# Patient Record
Sex: MALE | Race: WHITE | NOT HISPANIC OR LATINO | Employment: PART TIME | URBAN - METROPOLITAN AREA
[De-identification: names, ages, dates, MRNs, and addresses within clinical notes are randomized per-mention and may not be internally consistent; named-entity substitution may affect disease eponyms.]

---

## 2017-03-10 ENCOUNTER — HOSPITAL ENCOUNTER (OUTPATIENT)
Dept: RADIOLOGY | Facility: MEDICAL CENTER | Age: 27
End: 2017-03-10
Attending: INTERNAL MEDICINE | Admitting: SURGERY
Payer: COMMERCIAL

## 2017-03-10 DIAGNOSIS — Z01.810 PRE-OPERATIVE CARDIOVASCULAR EXAMINATION: ICD-10-CM

## 2017-03-10 DIAGNOSIS — Z01.811 PRE-OPERATIVE RESPIRATORY EXAMINATION: ICD-10-CM

## 2017-03-10 DIAGNOSIS — Z01.812 PRE-OPERATIVE LABORATORY EXAMINATION: ICD-10-CM

## 2017-03-10 LAB
25(OH)D3 SERPL-MCNC: 7 NG/ML (ref 30–100)
ALBUMIN SERPL BCP-MCNC: 4 G/DL (ref 3.2–4.9)
ALBUMIN/GLOB SERPL: 1.4 G/DL
ALP SERPL-CCNC: 54 U/L (ref 30–99)
ALT SERPL-CCNC: 38 U/L (ref 2–50)
ANION GAP SERPL CALC-SCNC: 10 MMOL/L (ref 0–11.9)
AST SERPL-CCNC: 27 U/L (ref 12–45)
BASOPHILS # BLD AUTO: 0.6 % (ref 0–1.8)
BASOPHILS # BLD: 0.06 K/UL (ref 0–0.12)
BILIRUB SERPL-MCNC: 0.9 MG/DL (ref 0.1–1.5)
BUN SERPL-MCNC: 12 MG/DL (ref 8–22)
CALCIUM SERPL-MCNC: 9.4 MG/DL (ref 8.4–10.2)
CHLORIDE SERPL-SCNC: 98 MMOL/L (ref 96–112)
CHOLEST SERPL-MCNC: 238 MG/DL (ref 100–199)
CO2 SERPL-SCNC: 27 MMOL/L (ref 20–33)
CREAT SERPL-MCNC: 1.23 MG/DL (ref 0.5–1.4)
EKG IMPRESSION: NORMAL
EOSINOPHIL # BLD AUTO: 0.08 K/UL (ref 0–0.51)
EOSINOPHIL NFR BLD: 0.8 % (ref 0–6.9)
ERYTHROCYTE [DISTWIDTH] IN BLOOD BY AUTOMATED COUNT: 40.9 FL (ref 35.9–50)
FOLATE SERPL-MCNC: 12.6 NG/ML
GFR SERPL CREATININE-BSD FRML MDRD: >60 ML/MIN/1.73 M 2
GLOBULIN SER CALC-MCNC: 2.8 G/DL (ref 1.9–3.5)
GLUCOSE SERPL-MCNC: 129 MG/DL (ref 65–99)
HCT VFR BLD AUTO: 43.9 % (ref 42–52)
HDLC SERPL-MCNC: 34 MG/DL
HGB BLD-MCNC: 15.4 G/DL (ref 14–18)
IMM GRANULOCYTES # BLD AUTO: 0.06 K/UL (ref 0–0.11)
IMM GRANULOCYTES NFR BLD AUTO: 0.6 % (ref 0–0.9)
IRON SATN MFR SERPL: 15 % (ref 15–55)
IRON SERPL-MCNC: 62 UG/DL (ref 50–180)
LDLC SERPL CALC-MCNC: ABNORMAL MG/DL
LYMPHOCYTES # BLD AUTO: 2.92 K/UL (ref 1–4.8)
LYMPHOCYTES NFR BLD: 27.9 % (ref 22–41)
MCH RBC QN AUTO: 30.8 PG (ref 27–33)
MCHC RBC AUTO-ENTMCNC: 35.1 G/DL (ref 33.7–35.3)
MCV RBC AUTO: 87.8 FL (ref 81.4–97.8)
MONOCYTES # BLD AUTO: 0.47 K/UL (ref 0–0.85)
MONOCYTES NFR BLD AUTO: 4.5 % (ref 0–13.4)
NEUTROPHILS # BLD AUTO: 6.89 K/UL (ref 1.82–7.42)
NEUTROPHILS NFR BLD: 65.6 % (ref 44–72)
NRBC # BLD AUTO: 0 K/UL
NRBC BLD AUTO-RTO: 0 /100 WBC
PLATELET # BLD AUTO: 281 K/UL (ref 164–446)
PMV BLD AUTO: 10.4 FL (ref 9–12.9)
POTASSIUM SERPL-SCNC: 4.1 MMOL/L (ref 3.6–5.5)
PREALB SERPL-MCNC: 14 MG/DL (ref 18–38)
PROT SERPL-MCNC: 6.8 G/DL (ref 6–8.2)
PTH-INTACT SERPL-MCNC: 28.9 PG/ML (ref 14–72)
RBC # BLD AUTO: 5 M/UL (ref 4.7–6.1)
SODIUM SERPL-SCNC: 135 MMOL/L (ref 135–145)
TIBC SERPL-MCNC: 416 UG/DL (ref 250–450)
TRIGL SERPL-MCNC: 755 MG/DL (ref 0–149)
VIT B12 SERPL-MCNC: 492 PG/ML (ref 211–911)
WBC # BLD AUTO: 10.5 K/UL (ref 4.8–10.8)

## 2017-03-10 PROCEDURE — 82607 VITAMIN B-12: CPT

## 2017-03-10 PROCEDURE — 80061 LIPID PANEL: CPT

## 2017-03-10 PROCEDURE — 83970 ASSAY OF PARATHORMONE: CPT

## 2017-03-10 PROCEDURE — 83540 ASSAY OF IRON: CPT

## 2017-03-10 PROCEDURE — 83550 IRON BINDING TEST: CPT

## 2017-03-10 PROCEDURE — 82746 ASSAY OF FOLIC ACID SERUM: CPT

## 2017-03-10 PROCEDURE — 36415 COLL VENOUS BLD VENIPUNCTURE: CPT

## 2017-03-10 PROCEDURE — 84425 ASSAY OF VITAMIN B-1: CPT

## 2017-03-10 PROCEDURE — 80053 COMPREHEN METABOLIC PANEL: CPT

## 2017-03-10 PROCEDURE — 84134 ASSAY OF PREALBUMIN: CPT

## 2017-03-10 PROCEDURE — 71020 DX-CHEST-2 VIEWS: CPT

## 2017-03-10 PROCEDURE — 85025 COMPLETE CBC W/AUTO DIFF WBC: CPT

## 2017-03-10 PROCEDURE — 82306 VITAMIN D 25 HYDROXY: CPT

## 2017-03-13 LAB — VIT B1 BLD-MCNC: 102 NMOL/L (ref 70–180)

## 2017-03-14 ENCOUNTER — HOSPITAL ENCOUNTER (OUTPATIENT)
Facility: MEDICAL CENTER | Age: 27
End: 2017-03-15
Attending: SURGERY | Admitting: SURGERY
Payer: COMMERCIAL

## 2017-03-14 PROBLEM — E66.01 MORBID OBESITY (HCC): Status: ACTIVE | Noted: 2017-03-14

## 2017-03-14 LAB — PATHOLOGY CONSULT NOTE: NORMAL

## 2017-03-14 PROCEDURE — 160035 HCHG PACU - 1ST 60 MINS PHASE I: Performed by: SURGERY

## 2017-03-14 PROCEDURE — A4606 OXYGEN PROBE USED W OXIMETER: HCPCS | Performed by: SURGERY

## 2017-03-14 PROCEDURE — 501570 HCHG TROCAR, SEPARATOR: Performed by: SURGERY

## 2017-03-14 PROCEDURE — 700111 HCHG RX REV CODE 636 W/ 250 OVERRIDE (IP)

## 2017-03-14 PROCEDURE — 500522 HCHG ENDOSTITCH SUTURING DEVICE: Performed by: SURGERY

## 2017-03-14 PROCEDURE — 502571 HCHG PACK, LAP CHOLE: Performed by: SURGERY

## 2017-03-14 PROCEDURE — 501399 HCHG SPECIMAN BAG, ENDO CATC: Performed by: SURGERY

## 2017-03-14 PROCEDURE — 160036 HCHG PACU - EA ADDL 30 MINS PHASE I: Performed by: SURGERY

## 2017-03-14 PROCEDURE — 96374 THER/PROPH/DIAG INJ IV PUSH: CPT

## 2017-03-14 PROCEDURE — 700101 HCHG RX REV CODE 250

## 2017-03-14 PROCEDURE — 500800 HCHG LAPAROSCOPIC J/L HOOK: Performed by: SURGERY

## 2017-03-14 PROCEDURE — 700111 HCHG RX REV CODE 636 W/ 250 OVERRIDE (IP): Performed by: SURGERY

## 2017-03-14 PROCEDURE — 160002 HCHG RECOVERY MINUTES (STAT): Performed by: SURGERY

## 2017-03-14 PROCEDURE — 110371 HCHG SHELL REV 272: Performed by: SURGERY

## 2017-03-14 PROCEDURE — 501576 HCHG TROCAR, SMTH CAN&SEAL12: Performed by: SURGERY

## 2017-03-14 PROCEDURE — 160041 HCHG SURGERY MINUTES - EA ADDL 1 MIN LEVEL 4: Performed by: SURGERY

## 2017-03-14 PROCEDURE — 501583 HCHG TROCAR, THRD CAN&SEAL 5X100: Performed by: SURGERY

## 2017-03-14 PROCEDURE — 160048 HCHG OR STATISTICAL LEVEL 1-5: Performed by: SURGERY

## 2017-03-14 PROCEDURE — G0378 HOSPITAL OBSERVATION PER HR: HCPCS

## 2017-03-14 PROCEDURE — 94760 N-INVAS EAR/PLS OXIMETRY 1: CPT

## 2017-03-14 PROCEDURE — 700102 HCHG RX REV CODE 250 W/ 637 OVERRIDE(OP): Performed by: SURGERY

## 2017-03-14 PROCEDURE — 500865 HCHG NEEDLE, SPINAL 20G/22G: Performed by: SURGERY

## 2017-03-14 PROCEDURE — 96375 TX/PRO/DX INJ NEW DRUG ADDON: CPT

## 2017-03-14 PROCEDURE — A9270 NON-COVERED ITEM OR SERVICE: HCPCS | Performed by: SURGERY

## 2017-03-14 PROCEDURE — 160009 HCHG ANES TIME/MIN: Performed by: SURGERY

## 2017-03-14 PROCEDURE — 502652 HCHG STAPLES, ETHICON ECR60: Performed by: SURGERY

## 2017-03-14 PROCEDURE — 500521 HCHG ENDOSTITCH LOAD UNIT: Performed by: SURGERY

## 2017-03-14 PROCEDURE — 96376 TX/PRO/DX INJ SAME DRUG ADON: CPT

## 2017-03-14 PROCEDURE — 501571 HCHG TROCAR, SEPARATOR 12X100: Performed by: SURGERY

## 2017-03-14 PROCEDURE — 88305 TISSUE EXAM BY PATHOLOGIST: CPT

## 2017-03-14 PROCEDURE — A6402 STERILE GAUZE <= 16 SQ IN: HCPCS | Performed by: SURGERY

## 2017-03-14 PROCEDURE — 160029 HCHG SURGERY MINUTES - 1ST 30 MINS LEVEL 4: Performed by: SURGERY

## 2017-03-14 PROCEDURE — 110382 HCHG SHELL REV 271: Performed by: SURGERY

## 2017-03-14 PROCEDURE — 502240 HCHG MISC OR SUPPLY RC 0272: Performed by: SURGERY

## 2017-03-14 RX ORDER — DIPHENHYDRAMINE HCL 25 MG
25 TABLET ORAL EVERY 6 HOURS PRN
Status: DISCONTINUED | OUTPATIENT
Start: 2017-03-14 | End: 2017-03-15 | Stop reason: HOSPADM

## 2017-03-14 RX ORDER — ONDANSETRON 2 MG/ML
4 INJECTION INTRAMUSCULAR; INTRAVENOUS EVERY 6 HOURS PRN
Status: DISCONTINUED | OUTPATIENT
Start: 2017-03-14 | End: 2017-03-15 | Stop reason: HOSPADM

## 2017-03-14 RX ORDER — DIPHENHYDRAMINE HCL 25 MG
50 TABLET ORAL EVERY 6 HOURS PRN
Status: DISCONTINUED | OUTPATIENT
Start: 2017-03-14 | End: 2017-03-15 | Stop reason: HOSPADM

## 2017-03-14 RX ORDER — DIPHENHYDRAMINE HYDROCHLORIDE 50 MG/ML
25-50 INJECTION INTRAMUSCULAR; INTRAVENOUS EVERY 6 HOURS PRN
Status: DISCONTINUED | OUTPATIENT
Start: 2017-03-14 | End: 2017-03-15 | Stop reason: HOSPADM

## 2017-03-14 RX ORDER — INDOCYANINE GREEN AND WATER 25 MG
KIT INJECTION
Status: DISCONTINUED | OUTPATIENT
Start: 2017-03-14 | End: 2017-03-14 | Stop reason: HOSPADM

## 2017-03-14 RX ORDER — OXYCODONE HCL 5 MG/5 ML
5 SOLUTION, ORAL ORAL
Status: DISCONTINUED | OUTPATIENT
Start: 2017-03-14 | End: 2017-03-15 | Stop reason: HOSPADM

## 2017-03-14 RX ORDER — LIDOCAINE HYDROCHLORIDE 10 MG/ML
INJECTION, SOLUTION INFILTRATION; PERINEURAL
Status: COMPLETED
Start: 2017-03-14 | End: 2017-03-14

## 2017-03-14 RX ORDER — OXYCODONE HCL 5 MG/5 ML
10 SOLUTION, ORAL ORAL
Status: DISCONTINUED | OUTPATIENT
Start: 2017-03-14 | End: 2017-03-15 | Stop reason: HOSPADM

## 2017-03-14 RX ORDER — BUPIVACAINE HYDROCHLORIDE AND EPINEPHRINE 5; 5 MG/ML; UG/ML
INJECTION, SOLUTION EPIDURAL; INTRACAUDAL; PERINEURAL
Status: DISCONTINUED | OUTPATIENT
Start: 2017-03-14 | End: 2017-03-14 | Stop reason: HOSPADM

## 2017-03-14 RX ORDER — SODIUM CHLORIDE, SODIUM LACTATE, POTASSIUM CHLORIDE, CALCIUM CHLORIDE 600; 310; 30; 20 MG/100ML; MG/100ML; MG/100ML; MG/100ML
1000 INJECTION, SOLUTION INTRAVENOUS
Status: ACTIVE | OUTPATIENT
Start: 2017-03-14 | End: 2017-03-14

## 2017-03-14 RX ADMIN — FAMOTIDINE 20 MG: 10 INJECTION, SOLUTION INTRAVENOUS at 20:36

## 2017-03-14 RX ADMIN — ONDANSETRON 4 MG: 2 INJECTION, SOLUTION INTRAMUSCULAR; INTRAVENOUS at 20:35

## 2017-03-14 RX ADMIN — OXYCODONE HYDROCHLORIDE 10 MG: 5 SOLUTION ORAL at 14:45

## 2017-03-14 RX ADMIN — FENTANYL CITRATE 50 MCG: 50 INJECTION, SOLUTION INTRAMUSCULAR; INTRAVENOUS at 13:00

## 2017-03-14 RX ADMIN — POTASSIUM CHLORIDE: 149 INJECTION, SOLUTION, CONCENTRATE INTRAVENOUS at 20:41

## 2017-03-14 RX ADMIN — ONDANSETRON 4 MG: 2 INJECTION, SOLUTION INTRAMUSCULAR; INTRAVENOUS at 14:45

## 2017-03-14 RX ADMIN — POTASSIUM CHLORIDE: 149 INJECTION, SOLUTION, CONCENTRATE INTRAVENOUS at 13:15

## 2017-03-14 RX ADMIN — Medication: at 16:18

## 2017-03-14 RX ADMIN — FAMOTIDINE 20 MG: 10 INJECTION, SOLUTION INTRAVENOUS at 14:45

## 2017-03-14 RX ADMIN — ENOXAPARIN SODIUM 30 MG: 100 INJECTION SUBCUTANEOUS at 15:18

## 2017-03-14 RX ADMIN — LIDOCAINE HYDROCHLORIDE 0.2 ML: 10 INJECTION, SOLUTION INFILTRATION; PERINEURAL at 09:00

## 2017-03-14 RX ADMIN — FENTANYL CITRATE 50 MCG: 50 INJECTION, SOLUTION INTRAMUSCULAR; INTRAVENOUS at 12:45

## 2017-03-14 RX ADMIN — PHENOL 1 SPRAY: 1.5 LIQUID ORAL at 16:24

## 2017-03-14 ASSESSMENT — PAIN SCALES - GENERAL
PAINLEVEL_OUTOF10: 5
PAINLEVEL_OUTOF10: 6
PAINLEVEL_OUTOF10: 0
PAINLEVEL_OUTOF10: ASSUMED PAIN PRESENT

## 2017-03-14 ASSESSMENT — LIFESTYLE VARIABLES
EVER_SMOKED: YES
ALCOHOL_USE: NO

## 2017-03-14 NOTE — OP REPORT
DATE OF SERVICE:  03/14/2017    PREOPERATIVE DIAGNOSES:  Morbid obesity, migraines, sleep apnea.    POSTOPERATIVE DIAGNOSES:  Morbid obesity, migraines, sleep apnea, hiatal   hernia, crural diastasis.    OPERATIONS:  1.  Laparoscopic vertical sleeve gastrectomy for morbid obesity.  2.  Laparoscopic hiatal hernia repair.    SURGEON:  David Day MD    ASSISTANT:  MARIA EUGENIA Acevedo.    ANESTHESIA:  General endotracheal.    ANESTHESIOLOGIST:  Yuri Anne MD    INDICATIONS:  This patient is a 25-year-old male with morbid obesity.  He has   present BMI of 54.  He has multiple obesity related comorbidities.  He was   fully evaluated and found to be a good candidate for bariatric surgery and to   fulfill the National Institutes of Health consensus criteria for surgical   treatment of morbid obesity.    FINDINGS AT OPERATION:  He had marked intraabdominal obesity typical fat   pattern, liver was moderately enlarged with minimal fatty infiltration.  He   did have a significant crural diastasis with sliding hiatal hernia.    DESCRIPTION OF PROCEDURE:  With the patient in supine position under adequate   anesthesia, he was prepped and draped in the usual sterile fashion.  Local   anesthesia was infiltrated into the left mid abdomen.  A Veress needle was   inserted and the abdomen was insufflated to 15 mmHg with CO2 gas.  Initial   puncture wound was made in the supraumbilical midline utilizing 5 mm direct   visualizing separator type trocar cannula without difficulty.  Additional   puncture wounds, placed under direct vision, 12 mm in the right mid abdomen,   15 in the left mid abdomen and a 5 mm left subcostal port was placed and a 5   mm puncture in the epigastrium for the Austyn retractor, subsequently   because of obesity and need for an intraabdominal retractor a second 12 mm   port was placed.  The Nathansen retractor was inserted.  Patient was placed in   steep reverse Trendelenburg positioning.  The hiatus was  exposed.  A fairly   large sliding epigastric fat pad was resected and the crura were dissected   anteriorly and area of the angle of His peritoneal reflection was taken down.    With that complete, attention turned to the pylorus, which was identified and   approximately 4 cm proximal to pylorus, an area was selected for dissection   and from that point proximally around the greater curve of the stomach, the   gastrocolic and gastrosplenic omentum were taken down utilizing the EnSeal   bipolar ligation device.  This was taken up to include the short gastrics and   any posterior fundal adhesions fully exposing the left gerry in this fashion.    With that complete formation of the sleeve was begun utilizing a green load   linear stapler beginning 4 cm proximal to the pylorus.  After the initial   firing, the 40-Kazakh bougie was brought down by anesthesia and placed into   the antrum and a second green load linear stapler was utilized, staying well   wide of the angularis incisura sweeping cephalad.  Following this, gold load   stapler was utilized in a cephalad direction and then serially several blue   load linear staplers utilized to take the resection up to but just lateral to   the angle of His.  With that complete, the specimen was removed utilizing a 15   mm EndoCatch sac.  The staple line was then irrigated and aspirated   electrocautery was utilized for hemostasis.  The staple line was then oversewn   beginning at the apex at the angle of His and working inferiorly utilizing a   2-0 barbed absorbable Stratafix suture.  This was placed utilizing a running   seromuscular imbricating technique fully covering the staple line over the   upper 20 cm.  With that complete, the hiatal hernia was then repaired by   reapproximation of the crura anteriorly utilizing interrupted 0 braided Dacron   suture.  This functionally closed the anterior hiatus.  With that complete,   the bougie was removed, replaced with an  orogastric tube, utilizing blue dyed   saline and cricoid pressure with a clamp across the prepyloric area, the   sleeve was tested for conformity, which was excellent and there was no   evidence of leak.  The left upper quadrant was irrigated and aspirated staple   line was again checked.  The gastrocolic and gastrosplenic omentum were then   reapproximated to the left lateral aspect of the sleeve utilizing interrupted   3-0 Polysorb suture.  The Austyn retractor was then removed.  The 15 mm   port site closed with Endoclose suture of 0 Vicryl.  The skin was closed with   subcuticular 4-0 Vicryl.  Patient tolerated the procedure without   complication.  There was minimal blood loss.       ____________________________________     MD DK KYLE / EDDA    DD:  03/14/2017 12:47:58  DT:  03/14/2017 13:12:45    D#:  [id number]  Job#:  [id number]

## 2017-03-14 NOTE — OR NURSING
1228 received from or  Nasal airway placed orally   abd soft   5 dressings c/d/i   1245 awake  Airway dc'd  Medicated for pain  cpap applied 1315  Continuing to [id number] dressings c/d/i  Meets discharge criteria

## 2017-03-14 NOTE — IP AVS SNAPSHOT
3/15/2017          Patrick Green  [address]  [facility] [address]    Dear Patrick:    [facility] wants to ensure your discharge home is safe and you or your loved ones have had all your questions answered regarding your care after you leave the hospital.    You may receive a telephone call within two days of your discharge.  This call is to make certain you understand your discharge instructions as well as ensure we provided you with the best care possible during your stay with us.     The call will only last approximately 3-5 minutes and will be done by a nurse.    Once again, we want to ensure your discharge home is safe and that you have a clear understanding of any next steps in your care.  If you have any questions or concerns, please do not hesitate to contact us, we are here for you.  Thank you for choosing [facility] for your healthcare needs.    Sincerely,    Josue Merritt    [facility]

## 2017-03-14 NOTE — IP AVS SNAPSHOT
" Home Care Instructions                                                                                                                  Name:Patrick Green  Medical Record Number:[id number]  CSN: [id number]    YOB: 1990   Age: 26 y.o.  Sex: male  HT:1.702 m (5' 7.01\") WT: 154.7 kg (341 lb 0.8 oz)          Admit Date: 3/14/2017     Discharge Date:   Today's Date: 3/15/2017  Attending Doctor:  David Day M.D.                  Allergies:  Review of patient's allergies indicates no known allergies.            Discharge Instructions       Review discharge instructions:   No heavy lifting (no more than 10 pounds)   May shower.   Diet: bariatric full liquids x 3 weeks.   Start multivitamins/ minerals in one week.   Use incentive spirometer q1-2 hours while awake x 2 weeks.   Frequent walks.   Follow up in office in one week.   Call for concerns.          Discharge Instructions        Discharged to home by car with relative. Discharged via wheelchair, hospital escort: Yes.  Special equipment needed: Not Applicable    Be sure to schedule a follow-up appointment with your primary care doctor or any specialists as instructed.     Discharge Plan:   Diet Plan: Discussed  Activity Level: Discussed  Confirmed Follow up Appointment: Appointment Scheduled  Confirmed Symptoms Management: Discussed  Medication Reconciliation Updated: Yes  Influenza Vaccine Indication: Patient Refuses    I understand that a diet low in cholesterol, fat, and sodium is recommended for good health. Unless I have been given specific instructions below for another diet, I accept this instruction as my diet prescription.   Other diet: Full liquid    Special Instructions: None    · Is patient discharged on Warfarin / Coumadin?   No     · Is patient Post Blood Transfusion?  No  Sleeve Gastrectomy, Care After  Refer to this sheet in the next few weeks. These instructions provide you with information on caring for yourself after your " procedure. Your surgeon may also give you more specific instructions. Your treatment has been planned according to current medical practices, but problems sometimes occur. Call your surgeon if you have any problems or questions after your procedure.  HOME CARE INSTRUCTIONS  · Get plenty of rest, but move around frequently for short periods or take short walks as directed by your surgeon. Increase your activities gradually.  · Only take over-the-counter or prescription medicines as directed by your surgeon.  · Keep incision areas clean and dry. Remove or change any bandages (dressings) only as directed by your surgeon. You may have skin adhesive strips or glue over the incision areas. Do not take the strips or the glue off. They will fall off on their own.  · Check your incisions and surrounding areas daily for any redness, swelling, or drainage of fluid.  · Take showers once your surgeon approves. Until then, only take sponge baths. Pat incisions dry. Do not rub incisions with a washcloth or towel. Do not take tub baths or go swimming until your surgeon approves. Do not put anything on your incision to clean it unless directed to do so by your surgeon.  · Limit activities as directed by your surgeon. You will need to avoid strenuous activity, heavy lifting, and pushing or pulling things with your arms for several weeks. Do not lift anything heavier than 10 lb (4.5 kg).  · Perform deep breathing exercises and coughing as directed by your surgeon. This helps prevent a lung infection.  · Do not drive until your surgeon approves.  · Follow all of the dietary instructions provided by your surgeon or dietitian. You will receive specific instructions on the type, size, and timing of meals.  ¨   ¨   ¨ You may need to stay on a liquid diet for some time after the surgery.  ¨ Drink fluids frequently. You should drink 1 oz of fluid as often as you can.  ¨ Take vitamin, calcium, and protein supplements as directed by your  surgeon.  · If you have a drain from the incision area, make sure you:  ¨   ¨   ¨ Keep the area of the drain clean and dry.  ¨ Empty the drain and record the amount of fluid daily.  · Talk with your surgeon about when you may return to work and about your exercise routine.    · Keep all follow-up appointments with your surgeon and dietitian.  SEEK MEDICAL CARE IF:  · Your pain is not controlled with medicine.  · You have a fever.  · You have shaking chills.  · You notice any redness, skin irritation, swelling, or drainage of fluid (other than light red) in the incision area.  · Your drain gets pulled out accidentally.    · Your drain contains bright red blood, green fluid, or fluid that has a foul smell.  SEEK IMMEDIATE MEDICAL CARE IF:  · You have difficulty breathing.  · You have severe calf pain.  MAKE SURE YOU:  · Understand these instructions.  · Will watch your condition.  · Will get help right away if you are not doing well or get worse.     This information is not intended to replace advice given to you by your health care provider. Make sure you discuss any questions you have with your health care provider.     Document Released: 10/14/2010 Document Revised: 08/20/2014 Document Reviewed: 05/02/2014  [employer] Interactive Patient Education ©2016 [employer]      Depression / Suicide Risk    As you are discharged from this [facility] facility, it is important to learn how to keep safe from harming yourself.    Recognize the warning signs:  · Abrupt changes in personality, positive or negative- including increase in energy   · Giving away possessions  · Change in eating patterns- significant weight changes-  positive or negative  · Change in sleeping patterns- unable to sleep or sleeping all the time   · Unwillingness or inability to communicate  · Depression  · Unusual sadness, discouragement and loneliness  · Talk of wanting to die  · Neglect of personal appearance   · Rebelliousness- reckless  behavior  · Withdrawal from people/activities they love  · Confusion- inability to concentrate     If you or a loved one observes any of these behaviors or has concerns about self-harm, here's what you can do:  · Talk about it- your feelings and reasons for harming yourself  · Remove any means that you might use to hurt yourself (examples: pills, rope, extension cords, firearm)  · Get professional help from the community (Mental Health, Substance Abuse, psychological counseling)  · Do not be alone:Call your Safe Contact- someone whom you trust who will be there for you.  · Call your local CRISIS HOTLINE 316-1181 or 397-547-4185  · Call your local Children's Mobile Crisis Response Team [place] (718) 125-1757 or www.Digabit  · Call the toll free National Suicide Prevention Hotlines   · National Suicide Prevention Lifeline 968-740-UKWB (2659)  · [place] [facility] Line Network 800-SUICIDE (502-4173)           Discharge Medication Instructions:    Below are the medications your physician expects you to take upon discharge:    Review all your home medications and newly ordered medications with your doctor and/or pharmacist. Follow medication instructions as directed by your doctor and/or pharmacist.    Please keep your medication list with you and share with your physician.               Medication List      Notice     You have not been prescribed any medications.            Instructions           Diet / Nutrition:    Follow any diet instructions given to you by your doctor or the dietician, including how much salt (sodium) you are allowed each day.    If you are overweight, talk to your doctor about a weight reduction plan.    Activity:    Remain physically active following your doctor's instructions about exercise and activity.    Rest often.     Any time you become even a little tired or short of breath, SIT DOWN and rest.    Worsening Symptoms:    Report any of the following signs and symptoms to the  doctor's office immediately:    *Pain of jaw, arm, or neck  *Chest pain not relieved by medication                               *Dizziness or loss of consciousness  *Difficulty breathing even when at rest   *More tired than usual                                       *Bleeding drainage or swelling of surgical site  *Swelling of feet, ankles, legs or stomach                 *Fever (>100ºF)  *Pink or blood tinged sputum  *Weight gain (3lbs/day or 5lbs /week)           *Shock from internal defibrillator (if applicable)  *Palpitations or irregular heartbeats                *Cool and/or numb extremities    Stroke Awareness    Common Risk Factors for Stroke include:    Age  Atrial Fibrillation  Carotid Artery Stenosis  Diabetes Mellitus  Excessive alcohol consumption  High blood pressure  Overweight   Physical inactivity  Smoking    Warning signs and symptoms of a stroke include:    *Sudden numbness or weakness of the face, arm or leg (especially on one side of the body).  *Sudden confusion, trouble speaking or understanding.  *Sudden trouble seeing in one or both eyes.  *Sudden trouble walking, dizziness, loss of balance or coordination.Sudden severe headache with no known cause.    It is very important to get treatment quickly when a stroke occurs. If you experience any of the above warning signs, call 911 immediately.                   Disclaimer         Quit Smoking / Tobacco Use:    I understand the use of any tobacco products increases my chance of suffering from future heart disease or stroke and could cause other illnesses which may shorten my life. Quitting the use of tobacco products is the single most important thing I can do to improve my health. For further information on smoking / tobacco cessation call a Toll Free Quit Line at 1-244.684.4592 (*National Cancer [employer]) or 1-410.463.7125 (American Lung Association) or you can access the web based program at www.lungusa.org.    [place] Tobacco Users Help  Line:  (766) 610-3258       Toll Free: 1-675-696-4308  Quit Tobacco Program [facility] (917)329-2833    Crisis Hotline:    [place] Crisis Hotline:  2-296-NSBQMNQ or 1-428.863.9873    [place] Crisis Hotline:    1-818.864.1297 or 013-612-8907    Discharge Survey:   Thank you for choosing [facility]. We hope we did everything we could to make your hospital stay a pleasant one. You may be receiving a phone survey and we would appreciate your time and participation in answering the questions. Your input is very valuable to us in our efforts to improve our service to our patients and their families.        My signature on this form indicates that:    1. I have reviewed and understand the above information.  2. My questions regarding this information have been answered to my satisfaction.  3. I have formulated a plan with my discharge nurse to obtain my prescribed medications for home.                  Disclaimer         __________________________________                     __________       ________                       Patient Signature                                                 Date                    Time

## 2017-03-14 NOTE — IP AVS SNAPSHOT
Sens[employer]ore Access Code: [email or web address]  Expires: 4/14/2017  5:10 PM    Your email address is not on file at [facility].  Email Addresses are required for you to sign up for Sensicore, please contact [phone] to verify your personal information and to provide your email address prior to attempting to register for Sensicore.    Patrick Hurt Peter  [address] [place] [address]    Sensicore  A secure, online tool to manage your health information     [facility]’s Sensicore® is a secure, online tool that connects you to your personalized health information from the privacy of your home -- day or night - making it very easy for you to manage your healthcare. Once the activation process is completed, you can even access your medical information using the Sensicore nikhil, which is available for free in the Apple Nikhil store or Google Play store.     To learn more about Sensicore, visit [email or web address]    There are two levels of access available (as shown below):   My Chart Features  [facility] Primary Care Doctor [facility]  Specialists [facility]  Urgent  Care Non-[facility] Primary Care Doctor   Email your healthcare team securely and privately 24/7 X X X    Manage appointments: schedule your next appointment; view details of past/upcoming appointments X      Request prescription refills. X      View recent personal medical records, including lab and immunizations X X X X   View health record, including health history, allergies, medications X X X X   Read reports about your outpatient visits, procedures, consult and ER notes X X X X   See your discharge summary, which is a recap of your hospital and/or ER visit that includes your diagnosis, lab results, and care plan X X  X     How to register for [employer]:  Once your e-mail address has been verified, follow the following steps to sign up for Wow[employer]t.     1. Go to  [email or web address]  2. Click on the Sign Up Now box, which takes you to the New Member  Sign Up page. You will need to provide the following information:  a. Enter your [employer] Access Code exactly as it appears at the top of this page. (You will not need to use this code after you’ve completed the sign-up process. If you do not sign up before the expiration date, you must request a new code.)   b. Enter your date of birth.   c. Enter your home email address.   d. Click Submit, and follow the next screen’s instructions.  3. Create a [employer] ID. This will be your [employer] login ID and cannot be changed, so think of one that is secure and easy to remember.  4. Create a [employer] password. You can change your password at any time.  5. Enter your Password Reset Question and Answer. This can be used at a later time if you forget your password.   6. Enter your e-mail address. This allows you to receive e-mail notifications when new information is available in [employer].  7. Click Sign Up. You can now view your health information.    For assistance activating your [employer] account, call [phone]

## 2017-03-14 NOTE — IP AVS SNAPSHOT
" <p align=\"LEFT\"><IMG SRC=\"//[email or web address]$/[email or web address]\" alt=\"Image\" WIDTH=\"50%\" HEIGHT=\"200\" BORDER=\"\"></p>                   Name:Patrick Green  Medical Record Number:[id number]  CSN: [id number]    YOB: 1990   Age: 26 y.o.  Sex: male  HT:1.702 m (5' 7.01\") WT: 154.7 kg (341 lb 0.8 oz)          Admit Date: 3/14/2017     Discharge Date:   Today's Date: 3/15/2017  Attending Doctor:  David Day M.D.                  Allergies:  Review of patient's allergies indicates no known allergies.             Medication List      Notice     You have not been prescribed any medications.      "

## 2017-03-15 VITALS
OXYGEN SATURATION: 93 % | WEIGHT: 315 LBS | HEIGHT: 67 IN | HEART RATE: 96 BPM | BODY MASS INDEX: 49.44 KG/M2 | RESPIRATION RATE: 18 BRPM | SYSTOLIC BLOOD PRESSURE: 122 MMHG | TEMPERATURE: 98.6 F | DIASTOLIC BLOOD PRESSURE: 67 MMHG

## 2017-03-15 LAB
ANION GAP SERPL CALC-SCNC: 9 MMOL/L (ref 0–11.9)
BUN SERPL-MCNC: 12 MG/DL (ref 8–22)
CALCIUM SERPL-MCNC: 8.6 MG/DL (ref 8.4–10.2)
CHLORIDE SERPL-SCNC: 103 MMOL/L (ref 96–112)
CO2 SERPL-SCNC: 24 MMOL/L (ref 20–33)
CREAT SERPL-MCNC: 1.23 MG/DL (ref 0.5–1.4)
ERYTHROCYTE [DISTWIDTH] IN BLOOD BY AUTOMATED COUNT: 42.5 FL (ref 35.9–50)
GFR SERPL CREATININE-BSD FRML MDRD: >60 ML/MIN/1.73 M 2
GLUCOSE SERPL-MCNC: 106 MG/DL (ref 65–99)
HCT VFR BLD AUTO: 40.9 % (ref 42–52)
HGB BLD-MCNC: 14 G/DL (ref 14–18)
MCH RBC QN AUTO: 30.9 PG (ref 27–33)
MCHC RBC AUTO-ENTMCNC: 34.2 G/DL (ref 33.7–35.3)
MCV RBC AUTO: 90.3 FL (ref 81.4–97.8)
PLATELET # BLD AUTO: 225 K/UL (ref 164–446)
PMV BLD AUTO: 10.5 FL (ref 9–12.9)
POTASSIUM SERPL-SCNC: 4.3 MMOL/L (ref 3.6–5.5)
RBC # BLD AUTO: 4.53 M/UL (ref 4.7–6.1)
SODIUM SERPL-SCNC: 136 MMOL/L (ref 135–145)
WBC # BLD AUTO: 12.3 K/UL (ref 4.8–10.8)

## 2017-03-15 PROCEDURE — G0378 HOSPITAL OBSERVATION PER HR: HCPCS

## 2017-03-15 PROCEDURE — A9270 NON-COVERED ITEM OR SERVICE: HCPCS | Performed by: SURGERY

## 2017-03-15 PROCEDURE — 96376 TX/PRO/DX INJ SAME DRUG ADON: CPT

## 2017-03-15 PROCEDURE — 80048 BASIC METABOLIC PNL TOTAL CA: CPT

## 2017-03-15 PROCEDURE — 700102 HCHG RX REV CODE 250 W/ 637 OVERRIDE(OP): Performed by: SURGERY

## 2017-03-15 PROCEDURE — 700111 HCHG RX REV CODE 636 W/ 250 OVERRIDE (IP): Performed by: SURGERY

## 2017-03-15 PROCEDURE — 85027 COMPLETE CBC AUTOMATED: CPT

## 2017-03-15 PROCEDURE — 36415 COLL VENOUS BLD VENIPUNCTURE: CPT

## 2017-03-15 RX ADMIN — POTASSIUM CHLORIDE: 149 INJECTION, SOLUTION, CONCENTRATE INTRAVENOUS at 05:09

## 2017-03-15 RX ADMIN — ONDANSETRON 4 MG: 2 INJECTION, SOLUTION INTRAMUSCULAR; INTRAVENOUS at 05:16

## 2017-03-15 RX ADMIN — OXYCODONE HYDROCHLORIDE 10 MG: 5 SOLUTION ORAL at 13:53

## 2017-03-15 RX ADMIN — FAMOTIDINE 20 MG: 10 INJECTION, SOLUTION INTRAVENOUS at 08:44

## 2017-03-15 RX ADMIN — OXYCODONE HYDROCHLORIDE 5 MG: 5 SOLUTION ORAL at 10:27

## 2017-03-15 ASSESSMENT — PAIN SCALES - GENERAL
PAINLEVEL_OUTOF10: 5
PAINLEVEL_OUTOF10: 4
PAINLEVEL_OUTOF10: 4
PAINLEVEL_OUTOF10: 5
PAINLEVEL_OUTOF10: 5

## 2017-03-15 ASSESSMENT — ENCOUNTER SYMPTOMS
HEARTBURN: 0
BLURRED VISION: 0
MYALGIAS: 0
VOMITING: 0
NAUSEA: 0
HEADACHES: 0
COUGH: 0
FEVER: 0

## 2017-03-15 NOTE — DISCHARGE SUMMARY
David Day M.D. Physician Unsigned Transcription  OP Report 3/14/2017 12:47 PM        DATE OF ADMISSION:  03/14/2017    DATE OF PROCEDURE: 3/14/2017    DATE OF DISCHARGE: 3/15/2017      ADMISSION DIAGNOSES:  Morbid obesity, migraines, sleep apnea.     DISCHARGE DIAGNOSES:  Morbid obesity, migraines, sleep apnea, hiatal    hernia, crural diastasis.     OPERATIONS:  1.  Laparoscopic vertical sleeve gastrectomy for morbid obesity.  2.  Laparoscopic hiatal hernia repair.     SURGEON:  David Day MD        ANESTHESIA:  General endotracheal.        INDICATIONS:  This patient is a 25-year-old male with morbid obesity.  He has    present BMI of 54.  He has multiple obesity related comorbidities.  He was    fully evaluated and found to be a good candidate for bariatric surgery and to    fulfill the National Institutes of Health consensus criteria for surgical    treatment of morbid obesity.     FINDINGS AT OPERATION:  He had marked intraabdominal obesity typical fat    pattern, liver was moderately enlarged with minimal fatty infiltration.  He    did have a significant crural diastasis with sliding hiatal hernia.    HOSPITAL COURSE:  Patient did well in post op progression, tolerating fluids, walking with minimal assist, and is voiding, using incentive spirometer at bedside, and pain is well controlled.  He will remain on a bariatric full liquid diet x 3 weeks.      DISCHARGE MEDICATIONS: Patient has been given prescriptions for Zofran, omeprazole, and Hycet elixer and has directions for use.  He will start vitamin and mineral supplements in one week.      FOLLOW UP:  In office in one week with Dr. Day.

## 2017-03-15 NOTE — PROGRESS NOTES
Surgical Progress Note    Author: Macy Camara Date & Time created: 3/15/2017   4:32 PM     Interval Events:  Post op Day #1    Review of Systems   Constitutional: Negative for fever.   Eyes: Negative for blurred vision.   Respiratory: Negative for cough.    Cardiovascular: Negative for chest pain.   Gastrointestinal: Negative for heartburn, nausea and vomiting.   Genitourinary: Negative for dysuria.   Musculoskeletal: Negative for myalgias.   Skin: Negative for itching and rash.   Neurological: Negative for headaches.     Hemodynamics:  Temp (24hrs), Av.9 °C (98.5 °F), Min:36.6 °C (97.9 °F), Max:37.1 °C (98.8 °F)  Temperature: 37 °C (98.6 °F)  Pulse  Av.6  Min: 87  Max: 108   Blood Pressure: 122/67 mmHg     Respiratory:    Respiration: 18, Pulse Oximetry: 93 %        RUL Breath Sounds: Clear, RML Breath Sounds: Diminished, RLL Breath Sounds: Diminished, MOOKIE Breath Sounds: Clear, LLL Breath Sounds: Diminished  Neuro:  GCS       Fluids:    Intake/Output Summary (Last 24 hours) at 03/15/17 1632  Last data filed at 03/15/17 1000   Gross per 24 hour   Intake   2150 ml   Output   1250 ml   Net    900 ml       Current Diet Order   Procedures   • DIET ORDER     Physical Exam   Constitutional: He is oriented to person, place, and time. He appears well-developed and well-nourished.   HENT:   Head: Normocephalic.   Eyes: Pupils are equal, round, and reactive to light.   Neck: Normal range of motion.   Cardiovascular: Regular rhythm.    Pulmonary/Chest: Effort normal.   Abdominal: Soft.   Neurological: He is alert and oriented to person, place, and time.   Skin: Skin is warm and dry.     Labs:  Recent Results (from the past 24 hour(s))   BASIC METABOLIC PANEL    Collection Time: 03/15/17  4:35 AM   Result Value Ref Range    Sodium 136 135 - 145 mmol/L    Potassium 4.3 3.6 - 5.5 mmol/L    Chloride 103 96 - 112 mmol/L    Co2 24 20 - 33 mmol/L    Glucose 106 (H) 65 - 99 mg/dL    Bun 12 8 - 22 mg/dL    Creatinine  1.23 0.50 - 1.40 mg/dL    Calcium 8.6 8.4 - 10.2 mg/dL    Anion Gap 9.0 0.0 - 11.9   CBC WITHOUT DIFFERENTIAL    Collection Time: 03/15/17  4:35 AM   Result Value Ref Range    WBC 12.3 (H) 4.8 - 10.8 K/uL    RBC 4.53 (L) 4.70 - 6.10 M/uL    Hemoglobin 14.0 14.0 - 18.0 g/dL    Hematocrit 40.9 (L) 42.0 - 52.0 %    MCV 90.3 81.4 - 97.8 fL    MCH 30.9 27.0 - 33.0 pg    MCHC 34.2 33.7 - 35.3 g/dL    RDW 42.5 35.9 - 50.0 fL    Platelet Count 225 164 - 446 K/uL    MPV 10.5 9.0 - 12.9 fL   ESTIMATED GFR    Collection Time: 03/15/17  4:35 AM   Result Value Ref Range    GFR If African American >60 >60 mL/min/1.73 m 2    GFR If Non African American >60 >60 mL/min/1.73 m 2     Medical Decision Making, by Problem:  Active Hospital Problems    Diagnosis   • Morbid obesity (CMS-HCC) [E66.01]     Plan:  Advance to bariatric full liquids.  Encourage out of bed.  Use incentive spirometer q 1- 2 hours while awake.    Quality Measures:  Labs reviewed and Medications reviewed  Bailon catheter: No Bailon      DVT Prophylaxis: Enoxaparin (Lovenox)  DVT prophylaxis - mechanical: SCDs  Ulcer prophylaxis: Not indicated          Discussed patient condition with Family, RN and Patient

## 2017-03-15 NOTE — DIETARY
BMI - Pt with BMI >40 (53.4).   Pt s/p Laparoscopic vertical sleeve gastrectomy, anticipate follow-up with MD/RD post discharge.

## 2017-03-15 NOTE — CARE PLAN
Problem: Pain Management  Goal: Pain level will decrease to patient’s comfort goal  Intervention: Follow pain managment plan developed in collaboration with patient and Interdisciplinary Team  Pain assessment completed, pharmacologic and non-pharmacologic pain management methods in use.

## 2017-03-15 NOTE — PROGRESS NOTES
Call to Dr. Day for clarification on patient's diet. Order still states NPO. Verbal orders to advance to gastric clears for breakfast and dinner, then full liquid this afternoon.  Pt updated.

## 2017-03-15 NOTE — PROGRESS NOTES
Bedside report received. POC explained. Patient verbalizes understanding. Call light within reach. PCA pump in use and within reach as well as call light. Vital signs stable, unlabored breathing will cont with care.

## 2017-03-15 NOTE — CARE PLAN
Problem: Venous Thromboembolism (VTW)/Deep Vein Thrombosis (DVT) Prevention:  Goal: Patient will participate in Venous Thrombosis (VTE)/Deep Vein Thrombosis (DVT)Prevention Measures    03/14/17 1501   OTHER   Risk Assessment Score 1   VTE RISK Moderate   Mechanical Prophylaxis SCDs, Sequentials (Intermittent Pneumatic Compression Devices)   Pharmacologic Prophylaxis Used LMWH: Enoxaparin(Lovenox)         Problem: Knowledge Deficit  Goal: Knowledge of disease process/condition, treatment plan, diagnostic tests, and medications will improve  The plan of care for today discussed with patient and family (rest, pain management, ambulation with staff, hopeful discharge home in the AM)    Problem: Pain Management  Goal: Pain level will decrease to patient’s comfort goal  Medications ordered and in place to treat patient complaints of pain on an as needed basis per patient request and nursing assessment.

## 2017-03-16 NOTE — DISCHARGE INSTRUCTIONS
Review discharge instructions:   No heavy lifting (no more than 10 pounds)   May shower.   Diet: bariatric full liquids x 3 weeks.   Start multivitamins/ minerals in one week.   Use incentive spirometer q1-2 hours while awake x 2 weeks.   Frequent walks.   Follow up in office in one week.   Call for concerns.          Discharge Instructions        Discharged to home by car with relative. Discharged via wheelchair, hospital escort: Yes.  Special equipment needed: Not Applicable    Be sure to schedule a follow-up appointment with your primary care doctor or any specialists as instructed.     Discharge Plan:   Diet Plan: Discussed  Activity Level: Discussed  Confirmed Follow up Appointment: Appointment Scheduled  Confirmed Symptoms Management: Discussed  Medication Reconciliation Updated: Yes  Influenza Vaccine Indication: Patient Refuses    I understand that a diet low in cholesterol, fat, and sodium is recommended for good health. Unless I have been given specific instructions below for another diet, I accept this instruction as my diet prescription.   Other diet: Full liquid    Special Instructions: None    · Is patient discharged on Warfarin / Coumadin?   No     · Is patient Post Blood Transfusion?  No  Sleeve Gastrectomy, Care After  Refer to this sheet in the next few weeks. These instructions provide you with information on caring for yourself after your procedure. Your surgeon may also give you more specific instructions. Your treatment has been planned according to current medical practices, but problems sometimes occur. Call your surgeon if you have any problems or questions after your procedure.  HOME CARE INSTRUCTIONS  · Get plenty of rest, but move around frequently for short periods or take short walks as directed by your surgeon. Increase your activities gradually.  · Only take over-the-counter or prescription medicines as directed by your surgeon.  · Keep incision areas clean and dry. Remove or change  any bandages (dressings) only as directed by your surgeon. You may have skin adhesive strips or glue over the incision areas. Do not take the strips or the glue off. They will fall off on their own.  · Check your incisions and surrounding areas daily for any redness, swelling, or drainage of fluid.  · Take showers once your surgeon approves. Until then, only take sponge baths. Pat incisions dry. Do not rub incisions with a washcloth or towel. Do not take tub baths or go swimming until your surgeon approves. Do not put anything on your incision to clean it unless directed to do so by your surgeon.  · Limit activities as directed by your surgeon. You will need to avoid strenuous activity, heavy lifting, and pushing or pulling things with your arms for several weeks. Do not lift anything heavier than 10 lb (4.5 kg).  · Perform deep breathing exercises and coughing as directed by your surgeon. This helps prevent a lung infection.  · Do not drive until your surgeon approves.  · Follow all of the dietary instructions provided by your surgeon or dietitian. You will receive specific instructions on the type, size, and timing of meals.  ¨   ¨   ¨ You may need to stay on a liquid diet for some time after the surgery.  ¨ Drink fluids frequently. You should drink 1 oz of fluid as often as you can.  ¨ Take vitamin, calcium, and protein supplements as directed by your surgeon.  · If you have a drain from the incision area, make sure you:  ¨   ¨   ¨ Keep the area of the drain clean and dry.  ¨ Empty the drain and record the amount of fluid daily.  · Talk with your surgeon about when you may return to work and about your exercise routine.    · Keep all follow-up appointments with your surgeon and dietitian.  SEEK MEDICAL CARE IF:  · Your pain is not controlled with medicine.  · You have a fever.  · You have shaking chills.  · You notice any redness, skin irritation, swelling, or drainage of fluid (other than light red) in the  incision area.  · Your drain gets pulled out accidentally.    · Your drain contains bright red blood, green fluid, or fluid that has a foul smell.  SEEK IMMEDIATE MEDICAL CARE IF:  · You have difficulty breathing.  · You have severe calf pain.  MAKE SURE YOU:  · Understand these instructions.  · Will watch your condition.  · Will get help right away if you are not doing well or get worse.     This information is not intended to replace advice given to you by your health care provider. Make sure you discuss any questions you have with your health care provider.     Document Released: 10/14/2010 Document Revised: 08/20/2014 Document Reviewed: 05/02/2014  [employer] Interactive Patient Education ©2016 [employer]      Depression / Suicide Risk    As you are discharged from this [facility] facility, it is important to learn how to keep safe from harming yourself.    Recognize the warning signs:  · Abrupt changes in personality, positive or negative- including increase in energy   · Giving away possessions  · Change in eating patterns- significant weight changes-  positive or negative  · Change in sleeping patterns- unable to sleep or sleeping all the time   · Unwillingness or inability to communicate  · Depression  · Unusual sadness, discouragement and loneliness  · Talk of wanting to die  · Neglect of personal appearance   · Rebelliousness- reckless behavior  · Withdrawal from people/activities they love  · Confusion- inability to concentrate     If you or a loved one observes any of these behaviors or has concerns about self-harm, here's what you can do:  · Talk about it- your feelings and reasons for harming yourself  · Remove any means that you might use to hurt yourself (examples: pills, rope, extension cords, firearm)  · Get professional help from the community (Mental Health, Substance Abuse, psychological counseling)  · Do not be alone:Call your Safe Contact- someone whom you trust who will be there for  you.  · Call your local CRISIS HOTLINE 776-1774 or 261-689-0465  · Call your local Children's Mobile Crisis Response Team [place] (062) 041-3241 or www.[employer]  · Call the toll free National Suicide Prevention Hotlines   · National Suicide Prevention Lifeline 717-093-MAGQ (5649)  · National [employer] Network 800-SUICIDE (824-0596)

## 2017-03-16 NOTE — PROGRESS NOTES
Pt discharged to home. Discharge instructions given to pt. Pt verbalizes understanding of instructions at this time. IV removed per policy. Dressing applied to site. Pt tolerated well. Escorted to facility entrance by staff via wheelchair. Accompanied by spouse. Transported via private vehicle.

## 2018-11-04 ENCOUNTER — APPOINTMENT (OUTPATIENT)
Dept: RADIOLOGY | Facility: IMAGING CENTER | Age: 28
End: 2018-11-04
Attending: PHYSICIAN ASSISTANT
Payer: COMMERCIAL

## 2018-11-04 ENCOUNTER — OFFICE VISIT (OUTPATIENT)
Dept: URGENT CARE | Facility: CLINIC | Age: 28
End: 2018-11-04
Payer: COMMERCIAL

## 2018-11-04 ENCOUNTER — APPOINTMENT (OUTPATIENT)
Dept: URGENT CARE | Facility: CLINIC | Age: 28
End: 2018-11-04
Payer: COMMERCIAL

## 2018-11-04 VITALS
WEIGHT: 259 LBS | BODY MASS INDEX: 40.65 KG/M2 | RESPIRATION RATE: 16 BRPM | HEART RATE: 80 BPM | OXYGEN SATURATION: 97 % | SYSTOLIC BLOOD PRESSURE: 130 MMHG | HEIGHT: 67 IN | DIASTOLIC BLOOD PRESSURE: 80 MMHG | TEMPERATURE: 97.7 F

## 2018-11-04 DIAGNOSIS — Z98.890 HISTORY OF FOOT SURGERY: ICD-10-CM

## 2018-11-04 DIAGNOSIS — M79.672 LEFT FOOT PAIN: ICD-10-CM

## 2018-11-04 PROCEDURE — 99204 OFFICE O/P NEW MOD 45 MIN: CPT | Performed by: PHYSICIAN ASSISTANT

## 2018-11-04 PROCEDURE — 73630 X-RAY EXAM OF FOOT: CPT | Mod: 26,LT | Performed by: PHYSICIAN ASSISTANT

## 2018-11-04 RX ORDER — MELOXICAM 7.5 MG/1
15 TABLET ORAL DAILY
Qty: 28 TAB | Refills: 0 | Status: SHIPPED | OUTPATIENT
Start: 2018-11-04 | End: 2018-11-18

## 2018-11-04 ASSESSMENT — ENCOUNTER SYMPTOMS
TINGLING: 0
EYE DISCHARGE: 0
ABDOMINAL PAIN: 0
DIARRHEA: 0
CHILLS: 0
COUGH: 0
FALLS: 0
FEVER: 0
JOINT SWELLING: 0
SENSORY CHANGE: 0
EYE REDNESS: 0
SORE THROAT: 0
BACK PAIN: 0

## 2018-11-04 NOTE — PROGRESS NOTES
"Subjective:      Patrick Green is a 28 y.o. male who presents with Foot Pain (x2.5weeks, pulling pain on both feet but mostly left foot)            Patient is a pleasant 28-year-old male who presents with worsening left foot pain over the last few weeks.  Patient reports long history of chronic bilateral feet pain, as patient has \"flat feet and has needed subsequent operation to the plantar aspect of the left foot and what he believes was 2011.  Patient reports notable pain with running and long periods of walking and underwent the surgery to have a \"screw in his calcaneus \"all along with movement of the tendon.  Patient reports minimal improvement after the surgery however has had continued pain with long periods of standing, long periods of walking and high impact exercise.  Patient reports he has been working long days at work in which he standing all day and this has made the pain in particular on his left foot worse.  Patient's been taking Tylenol and ibuprofen with mild improvement of symptoms.  Patient denies any injury, trauma or fall.      Foot Problem   The current episode started more than 1 year ago. The problem occurs intermittently (Worse the last 2 weeks. ). The problem has been waxing and waning. Pertinent negatives include no abdominal pain, chills, congestion, coughing, fever, joint swelling or sore throat. Exacerbated by: As above.  Treatments tried: As above.  The treatment provided mild relief.       Review of Systems   Constitutional: Negative for chills, fever and malaise/fatigue.   HENT: Negative for congestion and sore throat.    Eyes: Negative for discharge and redness.   Respiratory: Negative for cough.    Cardiovascular: Negative for leg swelling.   Gastrointestinal: Negative for abdominal pain and diarrhea.   Musculoskeletal: Positive for joint pain. Negative for back pain, falls and joint swelling.   Neurological: Negative for tingling and sensory change.   All other systems " "reviewed and are negative.         Objective:     /80 (BP Location: Left arm, Patient Position: Sitting, BP Cuff Size: Adult)   Pulse 80   Temp 36.5 °C (97.7 °F) (Temporal)   Resp 16   Ht 1.702 m (5' 7\")   Wt 117.5 kg (259 lb)   SpO2 97%   BMI 40.57 kg/m²    PMH:  has a past medical history of Anesthesia; Heart burn; Indigestion; and Sleep apnea.  MEDS:   Current Outpatient Prescriptions:   •  Ibuprofen (ADVIL PO), Take  by mouth., Disp: , Rfl:   ALLERGIES: No Known Allergies  SURGHX:   Past Surgical History:   Procedure Laterality Date   • GASTRIC SLEEVE LAPAROSCOPY  3/14/2017    Procedure: GASTRIC SLEEVE LAPAROSCOPY;  Surgeon: David Day M.D.;  Location: SURGERY [facility];  Service:    • FOOT SURGERY Left 2012    calcaneous osteotomy     SOCHX:  reports that he quit smoking about 4 years ago. His smoking use included Cigarettes. He has a 2.31 pack-year smoking history. He has never used smokeless tobacco. He reports that he does not drink alcohol or use drugs.  FH: Family history was reviewed, no pertinent findings to report    Physical Exam   Constitutional: He is oriented to person, place, and time. He appears well-developed and well-nourished. No distress.   HENT:   Head: Normocephalic and atraumatic.   Eyes: Pupils are equal, round, and reactive to light. Conjunctivae and EOM are normal.   Neck: Normal range of motion. Neck supple. No tracheal deviation present.   Cardiovascular: Normal rate.    Pulmonary/Chest: Effort normal. No respiratory distress.   Musculoskeletal:        Feet:    Well healed medial scar to the left foot- Without noted edema, erythema, or other skin changes- noted tenderness along the plantar fascia- and along the insertion to the calcaneous. Minimal foot arch.   Painful Dorsiflexion. Digits and ankle nontender. Without other bony tenderness. N/V intact distally.      Neurological: He is alert and oriented to person, place, and time.   Skin: Skin is warm. No rash " noted.   Psychiatric: He has a normal mood and affect. His behavior is normal. Judgment and thought content normal.   Vitals reviewed.           XR foot:  Post surgical changes are seen of the calcaneus. No fracture or dislocation is identified. There is mild interphalangeal joint space narrowing. No soft tissue swelling is identified.   I have reviewed the xray and agree with the official read.     Assessment/Plan:     1. Left foot pain  - DX-FOOT-COMPLETE 3+ LEFT; Future  - REFERRAL TO PODIATRY  - meloxicam (MOBIC) 7.5 MG Tab; Take 2 Tabs by mouth every day for 14 days.  Dispense: 28 Tab; Refill: 0    2. History of foot surgery  - REFERRAL TO PODIATRY    Work note provided. Encouraged ice message with tennis ball- in the least suspect that pt. Is with intermittent plantar fasciitis- encouraged night time splinting- referral to Podiatry was made today and will start the patient on Mobic- encouraged pt. To avoid other NSAIDs.   Patient given precautionary s/sx that mandate immediate follow up and evaluation in the ED. Advised of risks of not doing so.    DDX, Supportive care, and indications for immediate follow-up discussed with patient.    Instructed to return to clinic or nearest emergency department if we are not available for any change in condition, further concerns, or worsening of symptoms.    The patient demonstrated a good understanding and agreed with the treatment plan.  Please note that this dictation was created using voice recognition software. I have made every reasonable attempt to correct obvious errors, but I expect that there are errors of grammar and possibly content that I did not discover before finalizing the note.

## 2021-09-30 NOTE — LETTER
November 4, 2018         Patient: Patrick Green   YOB: 1990   Date of Visit: 11/4/2018           To Whom it May Concern:    Patrick Green was seen in my clinic on 11/4/2018. Please excuse this patient from work due to recent ailment.     If you have any questions or concerns, please don't hesitate to call.        Sincerely,           Ronal Jorgensen P.A.-C.  Electronically Signed      Detail Level: Generalized Detail Level: Zone Detail Level: Simple Azithromycin Pregnancy And Lactation Text: This medication is considered safe during pregnancy and is also secreted in breast milk. Birth Control Pills Pregnancy And Lactation Text: This medication should be avoided if pregnant and for the first 30 days post-partum. Topical Retinoid Pregnancy And Lactation Text: This medication is Pregnancy Category C. It is unknown if this medication is excreted in breast milk. Use Enhanced Medication Counseling?: No High Dose Vitamin A Counseling: Side effects reviewed, pt to contact office should one occur. Topical Clindamycin Pregnancy And Lactation Text: This medication is Pregnancy Category B and is considered safe during pregnancy. It is unknown if it is excreted in breast milk. Sarecycline Counseling: Patient advised regarding possible photosensitivity and discoloration of the teeth, skin, lips, tongue and gums.  Patient instructed to avoid sunlight, if possible.  When exposed to sunlight, patients should wear protective clothing, sunglasses, and sunscreen.  The patient was instructed to call the office immediately if the following severe adverse effects occur:  hearing changes, easy bruising/bleeding, severe headache, or vision changes.  The patient verbalized understanding of the proper use and possible adverse effects of sarecycline.  All of the patient's questions and concerns were addressed. Doxycycline Pregnancy And Lactation Text: This medication is Pregnancy Category D and not consider safe during pregnancy. It is also excreted in breast milk but is considered safe for shorter treatment courses. High Dose Vitamin A Pregnancy And Lactation Text: High dose vitamin A therapy is contraindicated during pregnancy and breast feeding. Bactrim Counseling:  I discussed with the patient the risks of sulfa antibiotics including but not limited to GI upset, allergic reaction, drug rash, diarrhea, dizziness, photosensitivity, and yeast infections.  Rarely, more serious reactions can occur including but not limited to aplastic anemia, agranulocytosis, methemoglobinemia, blood dyscrasias, liver or kidney failure, lung infiltrates or desquamative/blistering drug rashes. Tazorac Counseling:  Patient advised that medication is irritating and drying.  Patient may need to apply sparingly and wash off after an hour before eventually leaving it on overnight.  The patient verbalized understanding of the proper use and possible adverse effects of tazorac.  All of the patient's questions and concerns were addressed. Sarecycline Pregnancy And Lactation Text: This medication is Pregnancy Category D and not consider safe during pregnancy. It is also excreted in breast milk. Dapsone Counseling: I discussed with the patient the risks of dapsone including but not limited to hemolytic anemia, agranulocytosis, rashes, methemoglobinemia, kidney failure, peripheral neuropathy, headaches, GI upset, and liver toxicity.  Patients who start dapsone require monitoring including baseline LFTs and weekly CBCs for the first month, then every month thereafter.  The patient verbalized understanding of the proper use and possible adverse effects of dapsone.  All of the patient's questions and concerns were addressed. Erythromycin Counseling:  I discussed with the patient the risks of erythromycin including but not limited to GI upset, allergic reaction, drug rash, diarrhea, increase in liver enzymes, and yeast infections. Topical Sulfur Applications Counseling: Topical Sulfur Counseling: Patient counseled that this medication may cause skin irritation or allergic reactions.  In the event of skin irritation, the patient was advised to reduce the amount of the drug applied or use it less frequently.   The patient verbalized understanding of the proper use and possible adverse effects of topical sulfur application.  All of the patient's questions and concerns were addressed. Isotretinoin Counseling: Patient should get monthly blood tests, not donate blood, not drive at night if vision affected, not share medication, and not undergo elective surgery for 6 months after tx completed. Side effects reviewed, pt to contact office should one occur. Benzoyl Peroxide Pregnancy And Lactation Text: This medication is Pregnancy Category C. It is unknown if benzoyl peroxide is excreted in breast milk. Minocycline Counseling: Patient advised regarding possible photosensitivity and discoloration of the teeth, skin, lips, tongue and gums.  Patient instructed to avoid sunlight, if possible.  When exposed to sunlight, patients should wear protective clothing, sunglasses, and sunscreen.  The patient was instructed to call the office immediately if the following severe adverse effects occur:  hearing changes, easy bruising/bleeding, severe headache, or vision changes.  The patient verbalized understanding of the proper use and possible adverse effects of minocycline.  All of the patient's questions and concerns were addressed. Bactrim Pregnancy And Lactation Text: This medication is Pregnancy Category D and is known to cause fetal risk.  It is also excreted in breast milk. Tazorac Pregnancy And Lactation Text: This medication is not safe during pregnancy. It is unknown if this medication is excreted in breast milk. Dapsone Pregnancy And Lactation Text: This medication is Pregnancy Category C and is not considered safe during pregnancy or breast feeding. Spironolactone Counseling: Patient advised regarding risks of diarrhea, abdominal pain, hyperkalemia, birth defects (for female patients), liver toxicity and renal toxicity. The patient may need blood work to monitor liver and kidney function and potassium levels while on therapy. The patient verbalized understanding of the proper use and possible adverse effects of spironolactone.  All of the patient's questions and concerns were addressed. Benzoyl Peroxide Counseling: Patient counseled that medicine may cause skin irritation and bleach clothing.  In the event of skin irritation, the patient was advised to reduce the amount of the drug applied or use it less frequently.   The patient verbalized understanding of the proper use and possible adverse effects of benzoyl peroxide.  All of the patient's questions and concerns were addressed. Topical Sulfur Applications Pregnancy And Lactation Text: This medication is Pregnancy Category C and has an unknown safety profile during pregnancy. It is unknown if this topical medication is excreted in breast milk. Erythromycin Pregnancy And Lactation Text: This medication is Pregnancy Category B and is considered safe during pregnancy. It is also excreted in breast milk. Tetracycline Counseling: Patient counseled regarding possible photosensitivity and increased risk for sunburn.  Patient instructed to avoid sunlight, if possible.  When exposed to sunlight, patients should wear protective clothing, sunglasses, and sunscreen.  The patient was instructed to call the office immediately if the following severe adverse effects occur:  hearing changes, easy bruising/bleeding, severe headache, or vision changes.  The patient verbalized understanding of the proper use and possible adverse effects of tetracycline.  All of the patient's questions and concerns were addressed. Patient understands to avoid pregnancy while on therapy due to potential birth defects. Isotretinoin Pregnancy And Lactation Text: This medication is Pregnancy Category X and is considered extremely dangerous during pregnancy. It is unknown if it is excreted in breast milk. Azithromycin Counseling:  I discussed with the patient the risks of azithromycin including but not limited to GI upset, allergic reaction, drug rash, diarrhea, and yeast infections. Topical Retinoid counseling:  Patient advised to apply a pea-sized amount only at bedtime and wait 30 minutes after washing their face before applying.  If too drying, patient may add a non-comedogenic moisturizer. The patient verbalized understanding of the proper use and possible adverse effects of retinoids.  All of the patient's questions and concerns were addressed. Birth Control Pills Counseling: Birth Control Pill Counseling: I discussed with the patient the potential side effects of OCPs including but not limited to increased risk of stroke, heart attack, thrombophlebitis, deep venous thrombosis, hepatic adenomas, breast changes, GI upset, headaches, and depression.  The patient verbalized understanding of the proper use and possible adverse effects of OCPs. All of the patient's questions and concerns were addressed. Topical Clindamycin Counseling: Patient counseled that this medication may cause skin irritation or allergic reactions.  In the event of skin irritation, the patient was advised to reduce the amount of the drug applied or use it less frequently.   The patient verbalized understanding of the proper use and possible adverse effects of clindamycin.  All of the patient's questions and concerns were addressed. Spironolactone Pregnancy And Lactation Text: This medication can cause feminization of the male fetus and should be avoided during pregnancy. The active metabolite is also found in breast milk. Doxycycline Counseling:  Patient counseled regarding possible photosensitivity and increased risk for sunburn.  Patient instructed to avoid sunlight, if possible.  When exposed to sunlight, patients should wear protective clothing, sunglasses, and sunscreen.  The patient was instructed to call the office immediately if the following severe adverse effects occur:  hearing changes, easy bruising/bleeding, severe headache, or vision changes.  The patient verbalized understanding of the proper use and possible adverse effects of doxycycline.  All of the patient's questions and concerns were addressed.

## (undated) DEVICE — STAPLE BLUE 60MM X 3.5MM LEG (12EA/BX)***REPLACED USE #11149 PART #GST60B****

## (undated) DEVICE — Device

## (undated) DEVICE — CLOSURE SKIN STRIP 1/2 X 4 IN - (STERI STRIP) (50/BX 4BX/CA)

## (undated) DEVICE — STAPLER POWERED 60MM (3EA/BX)

## (undated) DEVICE — SYRINGE 50ML CATHETER TIP (40EA/BX 4BX/CA)

## (undated) DEVICE — TUBE E-T HI-LO CUFF 7.5MM (10EA/PK)

## (undated) DEVICE — GOWN WARMING STANDARD FLEX - (30/CA)

## (undated) DEVICE — DRESSING TRANSPARENT FILM TEGADERM 2.375 X 2.75"  (100EA/BX)"

## (undated) DEVICE — SUCTION INSTRUMENT YANKAUER BULBOUS TIP W/O VENT (50EA/CA)

## (undated) DEVICE — ELECTRODE 5MM LHK LAPSCP STERILE DISP- MEGADYNE  (5/CA)

## (undated) DEVICE — TOWELS CLOTH SURGICAL - (4/PK 20PK/CA)

## (undated) DEVICE — GLOVE, LITE (PAIR)

## (undated) DEVICE — GLOVE BIOGEL SZ 7 SURGICAL PF LTX - (50PR/BX 4BX/CA)

## (undated) DEVICE — SUTURE 0 VICRYL PLUS CT-1 - 36 INCH (36/BX)

## (undated) DEVICE — HEAD HOLDER JUNIOR/ADULT

## (undated) DEVICE — GLOVE BIOGEL ECLIPSE PF LATEX SIZE 8.0  (50PR/BX)

## (undated) DEVICE — KIT ANESTHESIA W/CIRCUIT & 3/LT BAG W/FILTER (20EA/CA)

## (undated) DEVICE — CHLORAPREP 26 ML APPLICATOR - ORANGE TINT(25/CA)

## (undated) DEVICE — ELECTRODE DUAL RETURN W/ CORD - (50/PK)

## (undated) DEVICE — SUTURE 4-0 VICRYL PLUS FS-2 - 27 INCH (36/BX)

## (undated) DEVICE — STAPLE 60MM GREEN 4.1MM - (12/BX)***REPLACED USE #11151 PART GST60G****

## (undated) DEVICE — LACTATED RINGERS INJ 1000 ML - (14EA/CA 60CA/PF)

## (undated) DEVICE — PROTECTOR ULNA NERVE - (36PR/CA)

## (undated) DEVICE — APPLICATOR COTTON TIP 6 IN - STERILE (2EA/PK 100PK/BX)

## (undated) DEVICE — SEALER ARTICULATING TISSUE NSEAL (6/BX)

## (undated) DEVICE — SODIUM CHL IRRIGATION 0.9% 1000ML (12EA/CA)

## (undated) DEVICE — CANNULA W/SEAL 5X100 Z-THRE - ADED KII (12/BX)

## (undated) DEVICE — WATER IRRIGATION STERILE 1000ML (12EA/CA)

## (undated) DEVICE — CANNULA W/SEAL12X100ZTHREAD - (12/BX)

## (undated) DEVICE — NEEDLE SPINAL NON-SAFETY 20 GA X 3 IN (25/BX)

## (undated) DEVICE — ENDOSTITCH10MM SUTURING DEVIC - (3/CA)

## (undated) DEVICE — SPECIMEN BAG ENDOCATCH II15MM 15MM SPECIMEN RETRIEVAL (3EA/CA)

## (undated) DEVICE — SENSOR SPO2 NEO LNCS ADHESIVE (20/BX) SEE USER NOTES

## (undated) DEVICE — TUBE CONNECTING SUCTION - CLEAR PLASTIC STERILE 72 IN (50EA/CA)

## (undated) DEVICE — MASK ANESTHESIA ADULT  - (100/CA)

## (undated) DEVICE — ENDOSTITCH LOAD UNIT 3-0 POLY (12EA/CA)

## (undated) DEVICE — KIT ROOM DECONTAMINATION

## (undated) DEVICE — ENDOSTITCH LOAD UNIT 0 SURGI - 12/CA

## (undated) DEVICE — GLOVE BIOGEL INDICATOR SZ 7SURGICAL PF LTX - (50/BX 4BX/CA)

## (undated) DEVICE — ELECTRODE 850 FOAM ADHESIVE - HYDROGEL RADIOTRNSPRNT (50/PK)

## (undated) DEVICE — SUTURE 2-0 20CM STRATAFIX SPIRAL SH NEEDLE (12/BX)

## (undated) DEVICE — SPONGE GAUZESTER. 2X2 4-PL - (2/PK 50PK/BX 30BX/CS)

## (undated) DEVICE — CANISTER SUCTION RIGID RED 1500CC (40EA/CA)

## (undated) DEVICE — TROCAR 5X100 NON BLADED Z-TH - READ KII (6/BX)

## (undated) DEVICE — DRAPESURG STERI-DRAPE LONG - (10/BX 4BX/CA)

## (undated) DEVICE — TROCAR SEPARATOR 15MMZTHREAD - (6/BX)

## (undated) DEVICE — STAPLE 60MM GOLD 3.8MM ECHELN (12EA/BX)****REPLACED USE #11150 PART #GST60D

## (undated) DEVICE — SET SUCTION/IRRIGATION WITH DISPOSABLE TIP (6/CA )PART #0250-070-520 IS A SUB

## (undated) DEVICE — TUBING INSUFFLATION - (10/BX)

## (undated) DEVICE — BAG, SPONGE COUNT 50600

## (undated) DEVICE — GLOVE SZ 7.5 LF PROTEXIS (50PR/BX)

## (undated) DEVICE — HUMID-VENT HEAT AND MOISTURE EXCHANGE- (50/BX)

## (undated) DEVICE — TROCAR Z THREAD12MM OPTICAL - NON BLADED (6/BX)